# Patient Record
Sex: FEMALE | Race: NATIVE HAWAIIAN OR OTHER PACIFIC ISLANDER | HISPANIC OR LATINO | Employment: PART TIME | URBAN - METROPOLITAN AREA
[De-identification: names, ages, dates, MRNs, and addresses within clinical notes are randomized per-mention and may not be internally consistent; named-entity substitution may affect disease eponyms.]

---

## 2022-02-24 ENCOUNTER — OFFICE VISIT (OUTPATIENT)
Dept: OBSTETRICS AND GYNECOLOGY | Facility: CLINIC | Age: 22
End: 2022-02-24
Payer: MEDICAID

## 2022-02-24 VITALS
HEART RATE: 69 BPM | DIASTOLIC BLOOD PRESSURE: 84 MMHG | HEIGHT: 65 IN | WEIGHT: 125.5 LBS | BODY MASS INDEX: 20.91 KG/M2 | SYSTOLIC BLOOD PRESSURE: 115 MMHG

## 2022-02-24 DIAGNOSIS — Z76.89 ENCOUNTER TO ESTABLISH CARE: ICD-10-CM

## 2022-02-24 DIAGNOSIS — Z11.3 SCREENING FOR STDS (SEXUALLY TRANSMITTED DISEASES): Primary | ICD-10-CM

## 2022-02-24 DIAGNOSIS — Z01.419 WELL WOMAN EXAM WITH ROUTINE GYNECOLOGICAL EXAM: ICD-10-CM

## 2022-02-24 PROCEDURE — 99385 PREV VISIT NEW AGE 18-39: CPT | Mod: S$GLB,,, | Performed by: NURSE PRACTITIONER

## 2022-02-24 PROCEDURE — 3079F PR MOST RECENT DIASTOLIC BLOOD PRESSURE 80-89 MM HG: ICD-10-PCS | Mod: CPTII,S$GLB,, | Performed by: NURSE PRACTITIONER

## 2022-02-24 PROCEDURE — 3079F DIAST BP 80-89 MM HG: CPT | Mod: CPTII,S$GLB,, | Performed by: NURSE PRACTITIONER

## 2022-02-24 PROCEDURE — 1160F PR REVIEW ALL MEDS BY PRESCRIBER/CLIN PHARMACIST DOCUMENTED: ICD-10-PCS | Mod: CPTII,S$GLB,, | Performed by: NURSE PRACTITIONER

## 2022-02-24 PROCEDURE — 1159F PR MEDICATION LIST DOCUMENTED IN MEDICAL RECORD: ICD-10-PCS | Mod: CPTII,S$GLB,, | Performed by: NURSE PRACTITIONER

## 2022-02-24 PROCEDURE — 1160F RVW MEDS BY RX/DR IN RCRD: CPT | Mod: CPTII,S$GLB,, | Performed by: NURSE PRACTITIONER

## 2022-02-24 PROCEDURE — 99385 PR PREVENTIVE VISIT,NEW,18-39: ICD-10-PCS | Mod: S$GLB,,, | Performed by: NURSE PRACTITIONER

## 2022-02-24 PROCEDURE — 3008F PR BODY MASS INDEX (BMI) DOCUMENTED: ICD-10-PCS | Mod: CPTII,S$GLB,, | Performed by: NURSE PRACTITIONER

## 2022-02-24 PROCEDURE — 3074F PR MOST RECENT SYSTOLIC BLOOD PRESSURE < 130 MM HG: ICD-10-PCS | Mod: CPTII,S$GLB,, | Performed by: NURSE PRACTITIONER

## 2022-02-24 PROCEDURE — 1159F MED LIST DOCD IN RCRD: CPT | Mod: CPTII,S$GLB,, | Performed by: NURSE PRACTITIONER

## 2022-02-24 PROCEDURE — 3074F SYST BP LT 130 MM HG: CPT | Mod: CPTII,S$GLB,, | Performed by: NURSE PRACTITIONER

## 2022-02-24 PROCEDURE — 3008F BODY MASS INDEX DOCD: CPT | Mod: CPTII,S$GLB,, | Performed by: NURSE PRACTITIONER

## 2022-02-24 NOTE — PROGRESS NOTES
"  Subjective:       Patient ID: Cassie Moore is a 21 y.o. female.    Chief Complaint:  Well Woman and STD CHECK      History of Present Illness  Pt here for gyn annual.  History and past labs reviewed with patient.    Complaints none and declined BC at this time.       Review of Systems  Review of Systems   Constitutional: Negative for appetite change and fatigue.   HENT: Negative for tinnitus.    Cardiovascular: Negative for chest pain and palpitations.   Gastrointestinal: Negative for abdominal pain, bloating, constipation, vomiting and reflux.   Endocrine: Negative for diabetes, hyperthyroidism and hypothyroidism.   Genitourinary: Negative for flank pain, menstrual problem, pelvic pain and postcoital bleeding.   Musculoskeletal: Negative for back pain and myalgias.   Integumentary:  Negative for rash, mole/lesion, breast mass and breast tenderness.   Neurological: Negative for vertigo, syncope and headaches.   Psychiatric/Behavioral: Negative for depression and sleep disturbance. The patient is not nervous/anxious.    Breast: Negative for lump, mass, mastodynia and tenderness          Objective:     Vitals:    02/24/22 1137   BP: 115/84   Pulse: 69   Weight: 56.9 kg (125 lb 8 oz)   Height: 5' 5" (1.651 m)        Physical Exam:   Constitutional: She is oriented to person, place, and time. She appears well-developed and well-nourished.    HENT:   Head: Normocephalic and atraumatic.    Eyes: Pupils are equal, round, and reactive to light. Conjunctivae are normal.    Neck: No thyromegaly present.    Cardiovascular: Normal rate, regular rhythm and normal heart sounds.  Exam reveals no clubbing, no cyanosis and no edema.     Pulmonary/Chest: Effort normal and breath sounds normal.        Abdominal: Soft. She exhibits no distension. Hernia confirmed negative in the right inguinal area and confirmed negative in the left inguinal area.     Genitourinary:    Vagina and uterus normal.      Pelvic exam was performed with " patient supine.   There is no rash, tenderness, lesion or injury on the right labia. There is no rash, tenderness, lesion or injury on the left labia. Cervix is normal. Right adnexum displays no mass, no tenderness and no fullness. Left adnexum displays no mass, no tenderness and no fullness. Cervix exhibits no discharge.           Musculoskeletal: Normal range of motion and moves all extremeties.       Neurological: She is alert and oriented to person, place, and time.    Skin: Skin is warm and dry. No cyanosis. Nails show no clubbing.    Psychiatric: She has a normal mood and affect. Her behavior is normal.       breast exam wnl- no nipple dc, skin changes, masses or lymph nodes palpated bilaterally    Assessment:     1. Screening for STDs (sexually transmitted diseases)    2. Well woman exam with routine gynecological exam              Plan:       Screening for STDs (sexually transmitted diseases)  -     C. trachomatis/N. gonorrhoeae by AMP DNA [facility]; Cervicovaginal  -     Trichomonas Vaginalis, [facility]; Future; Expected date: 02/24/2022    Well woman exam with routine gynecological exam  -     Liquid-based pap smear, screening         No follow-ups on file.

## 2022-02-28 DIAGNOSIS — B37.9 YEAST DETECTED: Primary | ICD-10-CM

## 2022-02-28 LAB
CHLAMYDIA: NEGATIVE
GONORRHEA: NEGATIVE
SOURCE: NORMAL
SOURCE: NORMAL
TRICHOMONAS AMPLIFIED: NEGATIVE

## 2022-02-28 RX ORDER — FLUCONAZOLE 100 MG/1
100 TABLET ORAL DAILY
Qty: 3 TABLET | Refills: 0 | Status: SHIPPED | OUTPATIENT
Start: 2022-02-28 | End: 2022-03-03

## 2023-04-28 ENCOUNTER — TELEPHONE (OUTPATIENT)
Dept: OBSTETRICS AND GYNECOLOGY | Facility: CLINIC | Age: 23
End: 2023-04-28
Payer: MEDICAID

## 2023-07-05 ENCOUNTER — TELEPHONE (OUTPATIENT)
Dept: OBSTETRICS AND GYNECOLOGY | Facility: CLINIC | Age: 23
End: 2023-07-05
Payer: MEDICAID

## 2023-07-05 NOTE — TELEPHONE ENCOUNTER
---    New Ob Request received and pt was informed that the Doctor has  approve the request.  Melinda            -- Message from Bianca Chavez sent at 7/5/2023  1:30 PM CDT -----  Type:  Needs Medical Advice    Who Called: Cassie Moore  Symptoms (please be specific):  NOB ( was confirmed in [place], 6 wks on 6/20/23)    How long has patient had these symptoms:  -  Pharmacy name and phone #:  -  Would the patient rather a call back or a response via [facility]?    Best Call Back Number: [phone]    Additional Information: please call needs appt

## 2023-07-10 DIAGNOSIS — Z34.91 ENCOUNTER FOR PREGNANCY RELATED EXAMINATION IN FIRST TRIMESTER: Primary | ICD-10-CM

## 2023-07-11 ENCOUNTER — PROCEDURE VISIT (OUTPATIENT)
Dept: OBSTETRICS AND GYNECOLOGY | Facility: CLINIC | Age: 23
End: 2023-07-11
Payer: MEDICAID

## 2023-07-11 DIAGNOSIS — Z34.91 ENCOUNTER FOR PREGNANCY RELATED EXAMINATION IN FIRST TRIMESTER: ICD-10-CM

## 2023-07-11 PROCEDURE — 76817 US OB/GYN PROCEDURE (VIEWPOINT): ICD-10-PCS | Mod: S$GLB,,, | Performed by: OBSTETRICS & GYNECOLOGY

## 2023-07-11 PROCEDURE — 76817 TRANSVAGINAL US OBSTETRIC: CPT | Mod: S$GLB,,, | Performed by: OBSTETRICS & GYNECOLOGY

## 2023-07-12 LAB
ABS NRBC COUNT: 0 X 10 3/UL (ref 0–0.01)
ABSOLUTE BASOPHIL: 0.06 X 10 3/UL (ref 0–0.22)
ABSOLUTE EOSINOPHIL: 0.02 X 10 3/UL (ref 0.04–0.54)
ABSOLUTE IMMATURE GRAN: 0.03 X 10 3/UL (ref 0–0.04)
ABSOLUTE LYMPHOCYTE: 1.78 X 10 3/UL (ref 0.86–4.75)
ABSOLUTE MONOCYTE: 0.5 X 10 3/UL (ref 0.22–1.08)
ANTIBODY SCREEN: NEGATIVE
BASOPHILS NFR BLD: 0.7 % (ref 0.2–1.2)
BLOOD GROUPING: NORMAL
BLOOD TYPE (D): POSITIVE
EOSINOPHIL NFR BLD: 0.2 % (ref 0.7–7)
HBV SURFACE AG SERPL QL IA: NONREACTIVE
HCT VFR BLD AUTO: 45.8 % (ref 37–47)
HCV IGG SERPL QL IA: NONREACTIVE
HGB BLD-MCNC: 15.7 G/DL (ref 12–16)
HIV 1+2 AB+HIV1 P24 AG SERPL QL IA: NONREACTIVE
IMMATURE GRANULOCYTES: 0.4 % (ref 0–0.5)
LYMPHOCYTES NFR BLD: 22.2 % (ref 19.3–53.1)
MCH RBC QN AUTO: 30.3 PG (ref 27–32)
MCHC RBC AUTO-ENTMCNC: 34.3 G/DL (ref 32–36)
MCV RBC AUTO: 88.4 FL (ref 82–100)
MONOCYTES NFR BLD: 6.2 % (ref 4.7–12.5)
NEUTROPHILS # BLD AUTO: 5.62 X 10 3/UL (ref 2.15–7.56)
NEUTROPHILS NFR BLD: 70.3 % (ref 34–71.1)
NUCLEATED RED BLOOD CELLS: 0 /100 WBC (ref 0–0.2)
PLATELET # BLD AUTO: 245 X 10 3/UL (ref 135–400)
RBC # BLD AUTO: 5.18 X 10 6/UL (ref 4.2–5.4)
RDW-SD: 44.4 FL (ref 37–54)
RUBELLA IGG SCREEN: NORMAL
SICKLE CELL PREP: NEGATIVE
SYPHILIS TREPONEMAL ANTIBODY: NONREACTIVE
WBC # BLD: 8.01 X 10 3/UL (ref 4.3–10.8)

## 2023-08-10 ENCOUNTER — ROUTINE PRENATAL (OUTPATIENT)
Dept: OBSTETRICS AND GYNECOLOGY | Facility: CLINIC | Age: 23
End: 2023-08-10
Payer: MEDICAID

## 2023-08-10 VITALS
BODY MASS INDEX: 23.13 KG/M2 | WEIGHT: 139 LBS | SYSTOLIC BLOOD PRESSURE: 112 MMHG | HEART RATE: 79 BPM | DIASTOLIC BLOOD PRESSURE: 76 MMHG

## 2023-08-10 DIAGNOSIS — Z34.91 ENCOUNTER FOR PREGNANCY RELATED EXAMINATION IN FIRST TRIMESTER: Primary | ICD-10-CM

## 2023-08-10 PROCEDURE — 99203 OFFICE O/P NEW LOW 30 MIN: CPT | Mod: TH,S$GLB,, | Performed by: OBSTETRICS & GYNECOLOGY

## 2023-08-10 PROCEDURE — 99203 PR OFFICE/OUTPT VISIT, NEW, LEVL III, 30-44 MIN: ICD-10-PCS | Mod: TH,S$GLB,, | Performed by: OBSTETRICS & GYNECOLOGY

## 2023-09-05 ENCOUNTER — ROUTINE PRENATAL (OUTPATIENT)
Dept: OBSTETRICS AND GYNECOLOGY | Facility: CLINIC | Age: 23
End: 2023-09-05
Payer: MEDICAID

## 2023-09-05 VITALS
WEIGHT: 139 LBS | DIASTOLIC BLOOD PRESSURE: 65 MMHG | HEART RATE: 98 BPM | BODY MASS INDEX: 23.13 KG/M2 | SYSTOLIC BLOOD PRESSURE: 103 MMHG

## 2023-09-05 DIAGNOSIS — Z34.02 ENCOUNTER FOR SUPERVISION OF NORMAL FIRST PREGNANCY IN SECOND TRIMESTER: Primary | ICD-10-CM

## 2023-09-05 PROCEDURE — 99213 PR OFFICE/OUTPT VISIT, EST, LEVL III, 20-29 MIN: ICD-10-PCS | Mod: TH,S$GLB,, | Performed by: NURSE PRACTITIONER

## 2023-09-05 PROCEDURE — 99213 OFFICE O/P EST LOW 20 MIN: CPT | Mod: TH,S$GLB,, | Performed by: NURSE PRACTITIONER

## 2023-09-05 NOTE — PROGRESS NOTES
Subjective:       Patient ID: Cassie Moore is a 23 y.o.  at 15w5d      Chief Complaint:  Routine Prenatal Visit      History of Present Illness  No complaints. Reports normal pregnancy ssx . Labs and history reviewed with pt.       Review of Systems  Denies n/v, f/c, dysuria, contractions,   VD, VB, round ligament pain, headaches      OB History          1    Para   0    Term   0       0    AB   0    Living   0         SAB   0    IAB   0    Ectopic   0    Multiple   0    Live Births   0                   Objective:     Vitals:    23 1346   BP: 103/65   Pulse: 98     Wt Readings from Last 3 Encounters:   23 63 kg (139 lb)   08/10/23 63 kg (139 lb)   22 56.9 kg (125 lb 8 oz)        nad  NCAT  pupils normal size  Skin nml no rashes or lesions  No resp distress, resp even and unlabored  Gravid nt, no rebound no guarding  No cyanosis or clubbing, edema appropriate for pregn  FH AGA  FHT: 150s       Assessment:        1. Encounter for supervision of normal first pregnancy in second trimester                Plan:        Encounter for supervision of normal first pregnancy in second trimester  -     Maternal Screen AFP (Single Marker); Future; Expected date: 2023           Encouraged PNV  Pain, fever, bleeding precautions   Follow up in about 4 weeks (around 10/3/2023).

## 2023-09-07 ENCOUNTER — PATIENT MESSAGE (OUTPATIENT)
Dept: OTHER | Facility: OTHER | Age: 23
End: 2023-09-07
Payer: MEDICAID

## 2023-09-09 LAB
AFP MOM: 1.09
AFP, SERUM: 37.3 NG/ML
CALC'D GESTATIONAL AGE: 15.7 WEEKS
COLLECTION DATE: NORMAL
COMMENT: NORMAL
DATE OF BIRTH: NORMAL
DONOR AGE: EGG RETRIEVAL: NORMAL
DONOR EGG: NO
EDD DETERMINED BY: NORMAL
EST'D DATE OF DELIVERY: NORMAL
HX OF NEURAL TUBE DEFECTS: NO
INSULIN DEPEND DIABETIC: NO
INTERPRETATION: NORMAL
Lab: NORMAL
MATERNAL WEIGHT: 139 LBS
MOTHER'S ETHNIC ORIGIN: NORMAL
NUMBER OF FETUSES: 1
PREV PREGNANCY DOWN SYND: NO
REPEAT SPECIMEN: NO
RISK FOR ONTD: NORMAL

## 2023-09-14 ENCOUNTER — PATIENT MESSAGE (OUTPATIENT)
Dept: OTHER | Facility: OTHER | Age: 23
End: 2023-09-14
Payer: MEDICAID

## 2023-09-25 ENCOUNTER — TELEPHONE (OUTPATIENT)
Dept: OBSTETRICS AND GYNECOLOGY | Facility: CLINIC | Age: 23
End: 2023-09-25
Payer: MEDICAID

## 2023-09-25 NOTE — TELEPHONE ENCOUNTER
----- Message from Olivia Capellan sent at 9/25/2023 12:19 PM CDT -----  Patient is calling to schedule due to fall..Please call her back at [phone].            Thanks  miriam

## 2023-09-25 NOTE — TELEPHONE ENCOUNTER
Reports fall on back at work.  Denies any vaginal bleeding, LOF,  or abdominal pain.  ER warnings given

## 2023-09-26 ENCOUNTER — ROUTINE PRENATAL (OUTPATIENT)
Dept: OBSTETRICS AND GYNECOLOGY | Facility: CLINIC | Age: 23
End: 2023-09-26
Payer: MEDICAID

## 2023-09-26 ENCOUNTER — PROCEDURE VISIT (OUTPATIENT)
Dept: OBSTETRICS AND GYNECOLOGY | Facility: CLINIC | Age: 23
End: 2023-09-26
Payer: MEDICAID

## 2023-09-26 VITALS
WEIGHT: 140.38 LBS | SYSTOLIC BLOOD PRESSURE: 107 MMHG | HEART RATE: 84 BPM | BODY MASS INDEX: 23.36 KG/M2 | DIASTOLIC BLOOD PRESSURE: 73 MMHG

## 2023-09-26 DIAGNOSIS — Z34.02 ENCOUNTER FOR SUPERVISION OF NORMAL FIRST PREGNANCY IN SECOND TRIMESTER: Primary | ICD-10-CM

## 2023-09-26 DIAGNOSIS — O46.90 VAGINAL BLEEDING DURING PREGNANCY: ICD-10-CM
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
## 2023-09-26 PROCEDURE — 99213 PR OFFICE/OUTPT VISIT, EST, LEVL III, 20-29 MIN: ICD-10-PCS | Mod: TH,S$GLB,, | Performed by: NURSE PRACTITIONER

## 2023-09-26 PROCEDURE — 99213 OFFICE O/P EST LOW 20 MIN: CPT | Mod: TH,S$GLB,, | Performed by: NURSE PRACTITIONER

## 2023-09-26 PROCEDURE — 76815 OB US LIMITED FETUS(S): CPT | Mod: S$GLB,,, | Performed by: OBSTETRICS & GYNECOLOGY

## 2023-09-26 PROCEDURE — 76815 US OB/GYN EXTENDED PROCEDURE (VIEWPOINT): ICD-10-PCS | Mod: S$GLB,,, | Performed by: OBSTETRICS & GYNECOLOGY

## 2023-09-26 NOTE — PROGRESS NOTES
Subjective:       Patient ID: Cassie Moore is a 23 y.o.  at 18w5d      Chief Complaint:  Routine Prenatal Visit      History of Present Illness  Complains of lower back and abdominal pain s/p fall yesterday at work. States she landed on her back. . Labs and history reviewed with pt.       Review of Systems  Denies n/v, f/c, dysuria, contractions,   VD, VB, round ligament pain, headaches      OB History          1    Para   0    Term   0       0    AB   0    Living   0         SAB   0    IAB   0    Ectopic   0    Multiple   0    Live Births   0                   Objective:     Vitals:    23 0843   BP: 107/73   Pulse: 84     Wt Readings from Last 3 Encounters:   23 63.7 kg (140 lb 6 oz)   23 63 kg (139 lb)   08/10/23 63 kg (139 lb)        nad  NCAT  pupils normal size  Skin nml no rashes or lesions  No resp distress, resp even and unlabored  Gravid nt, no rebound no guarding  No cyanosis or clubbing, edema appropriate for pregn  FH AGA  FHT: 140s       Assessment:        1. Encounter for supervision of normal first pregnancy in second trimester    2. Vaginal bleeding during pregnancy    3. Fall, initial encounter                Plan:        Encounter for supervision of normal first pregnancy in second trimester    Vaginal bleeding during pregnancy  -     US OB/GYN Procedure ([facility]) - Extended List; Future    , initial encounter  -     US OB/GYN Procedure ([facility]) - Extended List; Future       Tylenol/rest/maternity belt   Ultrasound today   Encouraged PNV  Pain, fever, bleeding precautions   Follow up for KA next week.

## 2023-10-04 ENCOUNTER — ROUTINE PRENATAL (OUTPATIENT)
Dept: OBSTETRICS AND GYNECOLOGY | Facility: CLINIC | Age: 23
End: 2023-10-04
Payer: MEDICAID

## 2023-10-04 VITALS
HEART RATE: 92 BPM | SYSTOLIC BLOOD PRESSURE: 101 MMHG | BODY MASS INDEX: 23.8 KG/M2 | DIASTOLIC BLOOD PRESSURE: 66 MMHG | WEIGHT: 143 LBS

## 2023-10-04 DIAGNOSIS — Z36.89 ENCOUNTER FOR FETAL ANATOMIC SURVEY: ICD-10-CM

## 2023-10-04 DIAGNOSIS — Z34.02 ENCOUNTER FOR SUPERVISION OF NORMAL FIRST PREGNANCY IN SECOND TRIMESTER: Primary | ICD-10-CM

## 2023-10-04 PROCEDURE — 90686 FLU VACCINE (QUAD) GREATER THAN OR EQUAL TO 3YO PRESERVATIVE FREE IM: ICD-10-PCS | Mod: S$GLB,,, | Performed by: NURSE PRACTITIONER

## 2023-10-04 PROCEDURE — 90471 FLU VACCINE (QUAD) GREATER THAN OR EQUAL TO 3YO PRESERVATIVE FREE IM: ICD-10-PCS | Mod: S$GLB,,, | Performed by: NURSE PRACTITIONER

## 2023-10-04 PROCEDURE — 90686 IIV4 VACC NO PRSV 0.5 ML IM: CPT | Mod: S$GLB,,, | Performed by: NURSE PRACTITIONER

## 2023-10-04 PROCEDURE — 90471 IMMUNIZATION ADMIN: CPT | Mod: S$GLB,,, | Performed by: NURSE PRACTITIONER

## 2023-10-04 PROCEDURE — 99213 PR OFFICE/OUTPT VISIT, EST, LEVL III, 20-29 MIN: ICD-10-PCS | Mod: 25,TH,S$GLB, | Performed by: NURSE PRACTITIONER

## 2023-10-04 PROCEDURE — 99213 OFFICE O/P EST LOW 20 MIN: CPT | Mod: 25,TH,S$GLB, | Performed by: NURSE PRACTITIONER

## 2023-10-04 NOTE — PROGRESS NOTES
Subjective:       Patient ID: Cassie Moore is a 23 y.o.  at 19w6d      Chief Complaint:  Routine Prenatal Visit      History of Present Illness  No complaints. Reports normal quickening. Labs and history reviewed with pt.       Review of Systems  Denies n/v, f/c, dysuria, contractions,   VD, VB, round ligament pain, headaches      OB History          1    Para   0    Term   0       0    AB   0    Living   0         SAB   0    IAB   0    Ectopic   0    Multiple   0    Live Births   0                   Objective:     Vitals:    10/04/23 1333   BP: 101/66   Pulse: 92     Wt Readings from Last 3 Encounters:   10/04/23 64.9 kg (143 lb)   23 63.7 kg (140 lb 6 oz)   23 63 kg (139 lb)        nad  NCAT  pupils normal size  Skin nml no rashes or lesions  No resp distress, resp even and unlabored  Gravid nt, no rebound no guarding  No cyanosis or clubbing, edema appropriate for pregn  FH AGA  FHT: 140s       Assessment:        1. Encounter for supervision of normal first pregnancy in second trimester    2. Encounter for fetal anatomic survey                Plan:        Encounter for supervision of normal first pregnancy in second trimester  -     Influenza - Quadrivalent (PF)    Encounter for fetal anatomic survey           Encouraged PNV  Pain, fever, bleeding precautions   Follow up in about 4 weeks (around 2023).

## 2023-10-05 ENCOUNTER — PATIENT MESSAGE (OUTPATIENT)
Dept: OTHER | Facility: OTHER | Age: 23
End: 2023-10-05
Payer: MEDICAID

## 2023-10-17 ENCOUNTER — PROCEDURE VISIT (OUTPATIENT)
Dept: OBSTETRICS AND GYNECOLOGY | Facility: CLINIC | Age: 23
End: 2023-10-17
Payer: MEDICAID

## 2023-10-17 DIAGNOSIS — Z36.89 ENCOUNTER FOR FETAL ANATOMIC SURVEY: ICD-10-CM

## 2023-10-17 PROCEDURE — 76805 OB US >/= 14 WKS SNGL FETUS: CPT | Mod: S$GLB,,, | Performed by: OBSTETRICS & GYNECOLOGY

## 2023-10-17 PROCEDURE — 76805 US OB/GYN EXTENDED PROCEDURE (VIEWPOINT): ICD-10-PCS | Mod: S$GLB,,, | Performed by: OBSTETRICS & GYNECOLOGY

## 2023-11-01 ENCOUNTER — ROUTINE PRENATAL (OUTPATIENT)
Dept: OBSTETRICS AND GYNECOLOGY | Facility: CLINIC | Age: 23
End: 2023-11-01
Payer: MEDICAID

## 2023-11-01 VITALS
BODY MASS INDEX: 24.55 KG/M2 | SYSTOLIC BLOOD PRESSURE: 108 MMHG | HEART RATE: 82 BPM | WEIGHT: 147.5 LBS | DIASTOLIC BLOOD PRESSURE: 74 MMHG

## 2023-11-01 DIAGNOSIS — Z34.02 ENCOUNTER FOR SUPERVISION OF NORMAL FIRST PREGNANCY IN SECOND TRIMESTER: Primary | ICD-10-CM

## 2023-11-01 PROCEDURE — 99213 PR OFFICE/OUTPT VISIT, EST, LEVL III, 20-29 MIN: ICD-10-PCS | Mod: TH,S$GLB,, | Performed by: NURSE PRACTITIONER

## 2023-11-01 PROCEDURE — 99213 OFFICE O/P EST LOW 20 MIN: CPT | Mod: TH,S$GLB,, | Performed by: NURSE PRACTITIONER

## 2023-11-01 NOTE — PROGRESS NOTES
Subjective:       Patient ID: Cassie Moore is a 23 y.o.  at 23w6d      Chief Complaint:  Routine Prenatal Visit      History of Present Illness  No complaints. Reports normal fetal movement. Labs and history reviewed with pt.       Review of Systems  Denies n/v, f/c, dysuria, contractions,   VD, VB, round ligament pain, headaches      OB History          1    Para   0    Term   0       0    AB   0    Living   0         SAB   0    IAB   0    Ectopic   0    Multiple   0    Live Births   0                   Objective:     Vitals:    23 0824   BP: 108/74   Pulse: 82     Wt Readings from Last 3 Encounters:   23 66.9 kg (147 lb 8 oz)   10/04/23 64.9 kg (143 lb)   23 63.7 kg (140 lb 6 oz)        nad  NCAT  pupils normal size  Skin nml no rashes or lesions  No resp distress, resp even and unlabored  Gravid nt, no rebound no guarding  No cyanosis or clubbing, edema appropriate for pregn  FH AGA  FHT: 140s       Assessment:        1. Encounter for supervision of normal first pregnancy in second trimester                Plan:        Encounter for supervision of normal first pregnancy in second trimester  -     Glucose, 1 Hr Post 50 GM; Future; Expected date: 2023         Anatomy wnl   Encouraged PNV  Pain, fever, bleeding precautions   Follow up in about 4 weeks (around 2023).

## 2023-11-02 ENCOUNTER — PATIENT MESSAGE (OUTPATIENT)
Dept: OTHER | Facility: OTHER | Age: 23
End: 2023-11-02
Payer: MEDICAID

## 2023-11-02 LAB — GLUCOSE 1 HR POST 50 GM: 138 MG/DL

## 2023-11-16 ENCOUNTER — TELEPHONE (OUTPATIENT)
Dept: OBSTETRICS AND GYNECOLOGY | Facility: CLINIC | Age: 23
End: 2023-11-16
Payer: MEDICAID

## 2023-11-16 NOTE — TELEPHONE ENCOUNTER
Pt is aware of her flu vaccination was fax.  Melinda            ----- Message from Bianca Chavez sent at 11/16/2023  8:59 AM CST -----  Type:  Needs Medical Advice    Who Called: Cassie Moore  Symptoms (please be specific): -   How long has patient had these symptoms:  -  Pharmacy name and phone #:  -  Would the patient rather a call back or a response via [facility]?    Best Call Back Number: [phone]    Additional Information:  pt needs copy of her flu vaccination wants to know if it can be faxed to [phone]

## 2023-12-06 ENCOUNTER — ROUTINE PRENATAL (OUTPATIENT)
Dept: OBSTETRICS AND GYNECOLOGY | Facility: CLINIC | Age: 23
End: 2023-12-06
Payer: MEDICAID

## 2023-12-06 VITALS
HEART RATE: 93 BPM | SYSTOLIC BLOOD PRESSURE: 104 MMHG | BODY MASS INDEX: 26.19 KG/M2 | DIASTOLIC BLOOD PRESSURE: 68 MMHG | WEIGHT: 157.38 LBS

## 2023-12-06 DIAGNOSIS — Z34.03 ENCOUNTER FOR SUPERVISION OF NORMAL FIRST PREGNANCY IN THIRD TRIMESTER: Primary | ICD-10-CM

## 2023-12-06 PROCEDURE — 90471 TDAP VACCINE GREATER THAN OR EQUAL TO 7YO IM: ICD-10-PCS | Mod: S$GLB,,, | Performed by: NURSE PRACTITIONER

## 2023-12-06 PROCEDURE — 90471 IMMUNIZATION ADMIN: CPT | Mod: S$GLB,,, | Performed by: NURSE PRACTITIONER

## 2023-12-06 PROCEDURE — 90715 TDAP VACCINE 7 YRS/> IM: CPT | Mod: S$GLB,,, | Performed by: NURSE PRACTITIONER

## 2023-12-06 PROCEDURE — 90715 TDAP VACCINE GREATER THAN OR EQUAL TO 7YO IM: ICD-10-PCS | Mod: S$GLB,,, | Performed by: NURSE PRACTITIONER

## 2023-12-06 PROCEDURE — 99213 PR OFFICE/OUTPT VISIT, EST, LEVL III, 20-29 MIN: ICD-10-PCS | Mod: 25,TH,S$GLB, | Performed by: NURSE PRACTITIONER

## 2023-12-06 PROCEDURE — 99213 OFFICE O/P EST LOW 20 MIN: CPT | Mod: 25,TH,S$GLB, | Performed by: NURSE PRACTITIONER

## 2023-12-06 NOTE — PROGRESS NOTES
CC: Follow-Up OB    HPI:   23 y.o.  at 28w6d with EDC of 2024, by Ultrasound, here for her follow up OB visit.  Denies any complaints.    Pregnancy ROS:  Positive fetal movement   Negative leakage of fluid   Negative vaginal bleeding   Negative headache, vision changes, RUQ pain, epigastric pain  Negative contractions/abdominal pain   Negative pelvic pressure     Physical Exam:  Prenatal Vitals  BP: 104/68  Weight: 71.4 kg (157 lb 6 oz)  Fetal Heart Rate: 140's  Urine Glucose: Negative  Urine Albumin: Negative    Wt Readings from Last 3 Encounters:   23 71.4 kg (157 lb 6 oz)   23 66.9 kg (147 lb 8 oz)   10/04/23 64.9 kg (143 lb)       Body mass index is 26.19 kg/m².    General: NAD, well developed, well nourished  Psych: alert and oriented to person, time and place, normal affect  HEENT: normocephalic, atraumatic  Abd: Gravid, soft, NT, ND  Skin: warm, dry  Neuro: normal gait, gross motor function intact  No cyanosis, clubbing or edema    FHTs: 140s  FH: AGA    Maternal Blood Type: O+/-    ASSESSMENT: 23 y.o.  @ 28w6d with   1. Encounter for supervision of normal first pregnancy in third trimester         PLAN:  Encounter for supervision of normal first pregnancy in third trimester  -     CBC Auto Differential; Future; Expected date: 2023  -     Tdap Vaccine       Pain, fever, bleeding precautions  Labor, Fetal movement, and Preeclampsia precautions  Cont PNV  Return to clinic in 2 weeks

## 2023-12-14 ENCOUNTER — PATIENT MESSAGE (OUTPATIENT)
Dept: OTHER | Facility: OTHER | Age: 23
End: 2023-12-14
Payer: MEDICAID

## 2023-12-14 LAB
APPEARANCE, UA: CLEAR
BILIRUB UR QL STRIP: NEGATIVE MG/DL
COLOR UR: ABNORMAL
FETAL FIBRONECTIN: NEGATIVE
GLUCOSE (UA): 1000 MG/DL
HGB UR QL STRIP: NEGATIVE /UL
KETONES UR QL STRIP: ABNORMAL MG/DL
LEUKOCYTE ESTERASE UR QL STRIP: NEGATIVE /UL
NITRITE UR QL STRIP: NEGATIVE
PH UR STRIP: 7 PH (ref 5–9)
PROT UR QL STRIP: NEGATIVE MG/DL
SP GR UR STRIP: 1.01 (ref 1–1.03)
SPECIMEN COLLECTION METHOD, URINE: ABNORMAL
UROBILINOGEN UR STRIP-ACNC: NORMAL MG/DL

## 2023-12-15 ENCOUNTER — TELEPHONE (OUTPATIENT)
Dept: OBSTETRICS AND GYNECOLOGY | Facility: CLINIC | Age: 23
End: 2023-12-15
Payer: MEDICAID

## 2023-12-15 NOTE — TELEPHONE ENCOUNTER
-  Pt is aware of her appt schedule 12/20/23 at 1:00. Melinda        ---- Message from Hernandez Berman sent at 12/15/2023  3:07 PM CST -----  Contact: Cassie Harris is calling in regards to getting a call back to discuss rescheduling her appt to the week of Dec 18th-22nd if available.  Please call back at  [phone]        Thanks

## 2023-12-20 ENCOUNTER — ROUTINE PRENATAL (OUTPATIENT)
Dept: OBSTETRICS AND GYNECOLOGY | Facility: CLINIC | Age: 23
End: 2023-12-20
Payer: MEDICAID

## 2023-12-20 VITALS
DIASTOLIC BLOOD PRESSURE: 69 MMHG | BODY MASS INDEX: 26.79 KG/M2 | HEART RATE: 99 BPM | WEIGHT: 161 LBS | SYSTOLIC BLOOD PRESSURE: 108 MMHG

## 2023-12-20 DIAGNOSIS — Z34.03 ENCOUNTER FOR SUPERVISION OF NORMAL FIRST PREGNANCY IN THIRD TRIMESTER: Primary | ICD-10-CM

## 2023-12-20 DIAGNOSIS — O99.019 ANTEPARTUM ANEMIA: ICD-10-CM

## 2023-12-20 PROCEDURE — 99213 OFFICE O/P EST LOW 20 MIN: CPT | Mod: TH,S$GLB,, | Performed by: NURSE PRACTITIONER

## 2023-12-20 PROCEDURE — 99213 PR OFFICE/OUTPT VISIT, EST, LEVL III, 20-29 MIN: ICD-10-PCS | Mod: TH,S$GLB,, | Performed by: NURSE PRACTITIONER

## 2023-12-20 RX ORDER — FERROUS SULFATE 325(65) MG
325 TABLET, DELAYED RELEASE (ENTERIC COATED) ORAL DAILY
Qty: 30 TABLET | Refills: 3 | Status: SHIPPED | OUTPATIENT
Start: 2023-12-20

## 2023-12-20 NOTE — PROGRESS NOTES
CC: Follow-Up OB    HPI:   23 y.o.  at 30w6d with EDC of 2024, by Ultrasound, here for her follow up OB visit.  Denies any complaints. Recently seen for contx with negative FFN. States she is feeling much better     Pregnancy ROS:  Positive fetal movement   Negative leakage of fluid   Negative vaginal bleeding   Negative headache, vision changes, RUQ pain, epigastric pain  Negative contractions/abdominal pain   Negative pelvic pressure     Physical Exam:  Prenatal Vitals  BP: 108/69  Weight: 73 kg (161 lb)  Fetal Heart Rate: 150s    Wt Readings from Last 3 Encounters:   23 73 kg (161 lb)   23 71.4 kg (157 lb 6 oz)   23 66.9 kg (147 lb 8 oz)       Body mass index is 26.79 kg/m².    General: NAD, well developed, well nourished  Psych: alert and oriented to person, time and place, normal affect  HEENT: normocephalic, atraumatic  Abd: Gravid, soft, NT, ND  Skin: warm, dry  Neuro: normal gait, gross motor function intact  No cyanosis, clubbing or edema    FHTs: 140s  FH: AGA    Maternal Blood Type: O+/-    Hgb 10.7  Hct 32.6    ASSESSMENT: 23 y.o.  @ 30w6d with   1. Encounter for supervision of normal first pregnancy in third trimester    2. Antepartum anemia         PLAN:  Encounter for supervision of normal first pregnancy in third trimester    Antepartum anemia  -     ferrous sulfate 325 (65 FE) MG EC tablet; Take 1 tablet (325 mg total) by mouth once daily.  Dispense: 30 tablet; Refill: 3       Pain, fever, bleeding precautions  Labor, Fetal movement, and Preeclampsia precautions  Cont PNV  Return to clinic in 2 weeks

## 2023-12-28 ENCOUNTER — PATIENT MESSAGE (OUTPATIENT)
Dept: OTHER | Facility: OTHER | Age: 23
End: 2023-12-28
Payer: MEDICAID

## 2024-01-03 ENCOUNTER — PATIENT MESSAGE (OUTPATIENT)
Dept: OBSTETRICS AND GYNECOLOGY | Facility: CLINIC | Age: 24
End: 2024-01-03

## 2024-01-03 ENCOUNTER — ROUTINE PRENATAL (OUTPATIENT)
Dept: OBSTETRICS AND GYNECOLOGY | Facility: CLINIC | Age: 24
End: 2024-01-03
Payer: MEDICAID

## 2024-01-03 VITALS
BODY MASS INDEX: 27.29 KG/M2 | DIASTOLIC BLOOD PRESSURE: 68 MMHG | SYSTOLIC BLOOD PRESSURE: 104 MMHG | HEART RATE: 85 BPM | WEIGHT: 164 LBS

## 2024-01-03 DIAGNOSIS — O99.019 ANTEPARTUM ANEMIA: ICD-10-CM

## 2024-01-03 DIAGNOSIS — Z34.03 ENCOUNTER FOR SUPERVISION OF NORMAL FIRST PREGNANCY IN THIRD TRIMESTER: Primary | ICD-10-CM

## 2024-01-03 PROCEDURE — 99213 OFFICE O/P EST LOW 20 MIN: CPT | Mod: TH,S$GLB,, | Performed by: NURSE PRACTITIONER

## 2024-01-03 NOTE — PROGRESS NOTES
CC: Follow-Up OB    HPI:   23 y.o.  at 32w6d with EDC of 2024, by Ultrasound, here for her follow up OB visit.  Denies any complaints. Seen at L&d last week for contx.     Pregnancy ROS:  Positive fetal movement   Negative leakage of fluid   Negative vaginal bleeding   Negative headache, vision changes, RUQ pain, epigastric pain  Negative contractions/abdominal pain   Negative pelvic pressure     Physical Exam:  Prenatal Vitals  BP: 104/68  Weight: 74.4 kg (164 lb)  Fetal Heart Rate: 150's    Wt Readings from Last 3 Encounters:   24 74.4 kg (164 lb)   23 73 kg (161 lb)   23 71.4 kg (157 lb 6 oz)       Body mass index is 27.29 kg/m².    General: NAD, well developed, well nourished  Psych: alert and oriented to person, time and place, normal affect  HEENT: normocephalic, atraumatic  Abd: Gravid, soft, NT, ND  Skin: warm, dry  Neuro: normal gait, gross motor function intact  No cyanosis, clubbing or edema    FHTs: 150s  FH: AGA    Maternal Blood Type: O+/-    ASSESSMENT: 23 y.o.  @ 32w6d with   1. Encounter for supervision of normal first pregnancy in third trimester    2. Antepartum anemia         PLAN:  Encounter for supervision of normal first pregnancy in third trimester    Antepartum anemia       Pain, fever, bleeding precautions  Labor, Fetal movement, and Preeclampsia precautions  Cont PNV/feso4  Return to clinic in 2 weeks

## 2024-01-09 LAB
APPEARANCE, UA: CLEAR
BACTERIA SPEC CULT: ABNORMAL /HPF
BILIRUB UR QL STRIP: NEGATIVE MG/DL
COLOR UR: ABNORMAL
GLUCOSE (UA): 1000 MG/DL
HGB UR QL STRIP: NEGATIVE /UL
KETONES UR QL STRIP: ABNORMAL MG/DL
LEUKOCYTE ESTERASE UR QL STRIP: 25 /UL
NITRITE UR QL STRIP: NEGATIVE
PH UR STRIP: 6.5 PH (ref 5–9)
PROT UR QL STRIP: 30 MG/DL
RBC #/AREA URNS HPF: ABNORMAL /HPF (ref 0–2)
SERVICE COMMENT 03: ABNORMAL
SP GR UR STRIP: 1.02 (ref 1–1.03)
SPECIMEN COLLECTION METHOD, URINE: ABNORMAL
SQUAMOUS EPITHELIAL, UA: ABNORMAL /LPF
UROBILINOGEN UR STRIP-ACNC: NORMAL MG/DL
WBC #/AREA URNS HPF: ABNORMAL /HPF (ref 0–5)

## 2024-01-17 ENCOUNTER — ROUTINE PRENATAL (OUTPATIENT)
Dept: OBSTETRICS AND GYNECOLOGY | Facility: CLINIC | Age: 24
End: 2024-01-17
Payer: MEDICAID

## 2024-01-17 VITALS
HEART RATE: 103 BPM | WEIGHT: 167.5 LBS | DIASTOLIC BLOOD PRESSURE: 69 MMHG | SYSTOLIC BLOOD PRESSURE: 117 MMHG | BODY MASS INDEX: 27.87 KG/M2

## 2024-01-17 DIAGNOSIS — Z34.83 ENCOUNTER FOR SUPERVISION OF NORMAL PREGNANCY IN MULTIGRAVIDA IN THIRD TRIMESTER: Primary | ICD-10-CM

## 2024-01-17 DIAGNOSIS — B86 SCABIES: ICD-10-CM

## 2024-01-17 PROCEDURE — 99213 OFFICE O/P EST LOW 20 MIN: CPT | Mod: TH,S$GLB,, | Performed by: NURSE PRACTITIONER

## 2024-01-17 RX ORDER — PERMETHRIN 50 MG/G
CREAM TOPICAL ONCE
Qty: 60 G | Refills: 1 | Status: SHIPPED | OUTPATIENT
Start: 2024-01-17 | End: 2024-01-17

## 2024-01-17 NOTE — PROGRESS NOTES
CC: Follow-Up OB    HPI:   23 y.o.  at 34w6d with EDC of 2024, by Ultrasound, here for her follow up OB visit.  Complains of itchy rash  and seen in the ER and dxed as PUPPs    Pregnancy ROS:  Positive fetal movement   Negative leakage of fluid   Negative vaginal bleeding   Negative headache, vision changes, RUQ pain, epigastric pain  Negative contractions/abdominal pain   Negative pelvic pressure     Physical Exam:  Prenatal Vitals  BP: 117/69  Weight: 76 kg (167 lb 8 oz)  Fetal Heart Rate: 130's    Wt Readings from Last 3 Encounters:   24 76 kg (167 lb 8 oz)   24 74.4 kg (164 lb)   23 73 kg (161 lb)       Body mass index is 27.87 kg/m².    General: NAD, well developed, well nourished  Psych: alert and oriented to person, time and place, normal affect  HEENT: normocephalic, atraumatic  Abd: Gravid, soft, NT, ND  Skin: warm, dry, linear rash +in between fingers  Neuro: normal gait, gross motor function intact  No cyanosis, clubbing or edema    FHTs: 130s  FH: AGA    Maternal Blood Type: O+/-    ASSESSMENT: 23 y.o.  @ 34w6d with   1. Encounter for supervision of normal pregnancy in multigravida in third trimester    2. Scabies         PLAN:  Encounter for supervision of normal pregnancy in multigravida in third trimester    Scabies  -     permethrin (ELIMITE) 5 % cream; Apply topically once. for 1 dose  Dispense: 60 g; Refill: 1       Pain, fever, bleeding precautions  Labor, Fetal movement, and Preeclampsia precautions  Cont PNV  Return to clinic in 1 week w/Dr Delgado

## 2024-01-18 ENCOUNTER — PATIENT MESSAGE (OUTPATIENT)
Dept: OTHER | Facility: OTHER | Age: 24
End: 2024-01-18
Payer: MEDICAID

## 2024-01-24 ENCOUNTER — ROUTINE PRENATAL (OUTPATIENT)
Dept: OBSTETRICS AND GYNECOLOGY | Facility: CLINIC | Age: 24
End: 2024-01-24
Payer: MEDICAID

## 2024-01-24 VITALS
DIASTOLIC BLOOD PRESSURE: 71 MMHG | HEART RATE: 91 BPM | SYSTOLIC BLOOD PRESSURE: 111 MMHG | BODY MASS INDEX: 27.69 KG/M2 | WEIGHT: 166.38 LBS

## 2024-01-24 DIAGNOSIS — Z34.83 ENCOUNTER FOR SUPERVISION OF NORMAL PREGNANCY IN MULTIGRAVIDA IN THIRD TRIMESTER: Primary | ICD-10-CM

## 2024-01-24 PROCEDURE — 99213 OFFICE O/P EST LOW 20 MIN: CPT | Mod: TH,S$GLB,, | Performed by: NURSE PRACTITIONER

## 2024-01-24 NOTE — PROGRESS NOTES
CC: Follow-Up OB    HPI:   23 y.o.  at 35w6d with EDC of 2024, by Ultrasound, here for her follow up OB visit.  Denies any complaints.    Pregnancy ROS:  Positive fetal movement   Negative leakage of fluid   Negative vaginal bleeding   Negative headache, vision changes, RUQ pain, epigastric pain  Negative contractions/abdominal pain   Negative pelvic pressure     Physical Exam:  Prenatal Vitals  BP: 111/71  Weight: 75.5 kg (166 lb 6 oz)  Fetal Heart Rate: 140s  Urine Glucose: Negative  Urine Albumin: Negative  Dilation/Effacement/Station  Dilation: Closed    Wt Readings from Last 3 Encounters:   24 75.5 kg (166 lb 6 oz)   24 76 kg (167 lb 8 oz)   24 74.4 kg (164 lb)       Body mass index is 27.69 kg/m².    General: NAD, well developed, well nourished  Psych: alert and oriented to person, time and place, normal affect  HEENT: normocephalic, atraumatic  Abd: Gravid, soft, NT, ND  Skin: warm, dry  Neuro: normal gait, gross motor function intact  Cvx closed/posterior/vtx  No cyanosis, clubbing or edema    FHTs: 140s  FH: AGA    Maternal Blood Type: O+/-    ASSESSMENT: 23 y.o.  @ 35w6d with   1. Encounter for supervision of normal pregnancy in multigravida in third trimester         PLAN:  Encounter for supervision of normal pregnancy in multigravida in third trimester  -     Strep B Screen, Vaginal / Rectal       Pain, fever, bleeding precautions  Labor, Fetal movement, and Preeclampsia precautions  Cont PNV  Return to clinic in 1 week W/Dr Delgado

## 2024-01-25 LAB
GROUP B STREP MOLECULAR: NEGATIVE
PENICILLIN ALLERGIC: NO

## 2024-01-30 ENCOUNTER — ROUTINE PRENATAL (OUTPATIENT)
Dept: OBSTETRICS AND GYNECOLOGY | Facility: CLINIC | Age: 24
End: 2024-01-30
Payer: MEDICAID

## 2024-01-30 VITALS
WEIGHT: 168 LBS | DIASTOLIC BLOOD PRESSURE: 67 MMHG | SYSTOLIC BLOOD PRESSURE: 101 MMHG | BODY MASS INDEX: 27.96 KG/M2 | HEART RATE: 102 BPM

## 2024-01-30 DIAGNOSIS — Z34.03 ENCOUNTER FOR SUPERVISION OF NORMAL FIRST PREGNANCY IN THIRD TRIMESTER: Primary | ICD-10-CM

## 2024-01-30 PROCEDURE — 99213 OFFICE O/P EST LOW 20 MIN: CPT | Mod: TH,S$GLB,, | Performed by: OBSTETRICS & GYNECOLOGY

## 2024-01-30 RX ORDER — FERROUS SULFATE TAB 325 MG (65 MG ELEMENTAL FE) 325 (65 FE) MG
TAB ORAL
COMMUNITY
Start: 2023-12-20

## 2024-01-30 NOTE — PROGRESS NOTES
Subjective:       Patient ID: Cassie Moore is a 23 y.o.  at 36w5d     Chief Complaint:  Routine Prenatal Visit      History of Present Illness  No complaints. Reports normal sx. Labs and history reviewed with pt.         Review of Systems  Denies n/v, f/c, dysuria, contractions,   VD, VB, round ligament pain, headaches, preE ROS       Objective:     Vitals:    24 1438   BP: 101/67   Pulse: 102     Wt Readings from Last 3 Encounters:   24 76.2 kg (168 lb)   24 75.5 kg (166 lb 6 oz)   24 76 kg (167 lb 8 oz)       nad  NCAT  pupils normal size  Skin nml no rashes or lesions  No resp distress, resp even and unlabored  Gravid nt, no rebound no guarding  No cyanosis or clubbing, edema appropriate for pregn    FHT: 150's  CVX: 1 50 h    Assessment:        1. Encounter for supervision of normal first pregnancy in third trimester                Plan:        Encouraged PNV  Pain, fever, bleeding precautions   RTC 1 weeks

## 2024-02-06 ENCOUNTER — TELEPHONE (OUTPATIENT)
Dept: OBSTETRICS AND GYNECOLOGY | Facility: CLINIC | Age: 24
End: 2024-02-06
Payer: MEDICAID

## 2024-02-06 LAB
APPEARANCE, UA: CLEAR
BACTERIA SPEC CULT: ABNORMAL /HPF
BILIRUB UR QL STRIP: NEGATIVE MG/DL
COLOR UR: ABNORMAL
GLUCOSE (UA): 250 MG/DL
HGB UR QL STRIP: 10 /UL
KETONES UR QL STRIP: ABNORMAL MG/DL
LEUKOCYTE ESTERASE UR QL STRIP: NEGATIVE /UL
NITRITE UR QL STRIP: NEGATIVE
PH UR STRIP: 6 PH (ref 5–9)
PROT UR QL STRIP: ABNORMAL MG/DL
RBC #/AREA URNS HPF: ABNORMAL /HPF (ref 0–2)
SERVICE COMMENT 03: ABNORMAL
SP GR UR STRIP: 1.02 (ref 1–1.03)
SPECIMEN COLLECTION METHOD, URINE: ABNORMAL
SQUAMOUS EPITHELIAL, UA: ABNORMAL /LPF
UROBILINOGEN UR STRIP-ACNC: NORMAL MG/DL
WBC #/AREA URNS HPF: ABNORMAL /HPF (ref 0–5)

## 2024-02-06 NOTE — TELEPHONE ENCOUNTER
--  Pt was informed to go to Labor and Delivery for evaluation. Pt is aware and voices understanding. Melinda        --- Message from Pam Bennett sent at 2/6/2024 12:26 PM CST -----  Contact: Cassie Harris is calling in regards to her having contraction and would like to be seen on 02/06.please call back at .[phone]           Thanks  FRANSISCA

## 2024-02-07 ENCOUNTER — ROUTINE PRENATAL (OUTPATIENT)
Dept: OBSTETRICS AND GYNECOLOGY | Facility: CLINIC | Age: 24
End: 2024-02-07
Payer: MEDICAID

## 2024-02-07 VITALS
WEIGHT: 173 LBS | SYSTOLIC BLOOD PRESSURE: 118 MMHG | DIASTOLIC BLOOD PRESSURE: 74 MMHG | BODY MASS INDEX: 28.79 KG/M2 | HEART RATE: 94 BPM

## 2024-02-07 DIAGNOSIS — Z34.83 ENCOUNTER FOR SUPERVISION OF NORMAL PREGNANCY IN MULTIGRAVIDA IN THIRD TRIMESTER: Primary | ICD-10-CM

## 2024-02-07 PROCEDURE — 99213 OFFICE O/P EST LOW 20 MIN: CPT | Mod: TH,S$GLB,, | Performed by: NURSE PRACTITIONER

## 2024-02-07 NOTE — PROGRESS NOTES
CC: Follow-Up OB    HPI:   23 y.o.  at 37w6d with EDC of 2024, by Ultrasound, here for her follow up OB visit.  Denies any complaints.    Pregnancy ROS:  Positive fetal movement   Negative leakage of fluid   Negative vaginal bleeding   Negative headache, vision changes, RUQ pain, epigastric pain  Negative contractions/abdominal pain   Negative pelvic pressure     Physical Exam:  Prenatal Vitals  BP: 118/74  Weight: 78.5 kg (173 lb)  Fetal Heart Rate: 140s  Urine Glucose: Negative  Urine Albumin: Negative    Wt Readings from Last 3 Encounters:   24 78.5 kg (173 lb)   24 76.2 kg (168 lb)   24 75.5 kg (166 lb 6 oz)       Body mass index is 28.79 kg/m².    General: NAD, well developed, well nourished  Psych: alert and oriented to person, time and place, normal affect  HEENT: normocephalic, atraumatic  Abd: Gravid, soft, NT, ND  Skin: warm, dry  Neuro: normal gait, gross motor function intact  Cvx ft/posterior/vtx  No cyanosis, clubbing or edema    FHTs: 140s  FH: AGA    Maternal Blood Type: O+/-    GBS negative     ASSESSMENT: 23 y.o.  @ 37w6d with   1. Encounter for supervision of normal pregnancy in multigravida in third trimester         PLAN:  Encounter for supervision of normal pregnancy in multigravida in third trimester       Pain, fever, bleeding precautions  Labor, Fetal movement, and Preeclampsia precautions  Cont PNV  Return to clinic in 1 week w/Dr Delgado

## 2024-02-12 ENCOUNTER — ROUTINE PRENATAL (OUTPATIENT)
Dept: OBSTETRICS AND GYNECOLOGY | Facility: CLINIC | Age: 24
End: 2024-02-12
Payer: MEDICAID

## 2024-02-12 VITALS
SYSTOLIC BLOOD PRESSURE: 109 MMHG | HEART RATE: 103 BPM | BODY MASS INDEX: 28.46 KG/M2 | DIASTOLIC BLOOD PRESSURE: 71 MMHG | WEIGHT: 171 LBS

## 2024-02-12 DIAGNOSIS — Z34.03 ENCOUNTER FOR SUPERVISION OF NORMAL FIRST PREGNANCY IN THIRD TRIMESTER: Primary | ICD-10-CM

## 2024-02-12 PROCEDURE — 99213 OFFICE O/P EST LOW 20 MIN: CPT | Mod: TH,S$GLB,, | Performed by: OBSTETRICS & GYNECOLOGY

## 2024-02-12 NOTE — PROGRESS NOTES
Subjective:       Patient ID: Cassie Moore is a 23 y.o.  at 38w4d     Chief Complaint:  Routine Prenatal Visit      History of Present Illness  No complaints. Reports normal sx. Labs and history reviewed with pt.         Review of Systems  Denies n/v, f/c, dysuria, contractions,   VD, VB, round ligament pain, headaches, preE ROS       Objective:   There were no vitals filed for this visit.  Wt Readings from Last 3 Encounters:   24 78.5 kg (173 lb)   24 76.2 kg (168 lb)   24 75.5 kg (166 lb 6 oz)       nad  NCAT  pupils normal size  Skin nml no rashes or lesions  No resp distress, resp even and unlabored  Gravid nt, no rebound no guarding  No cyanosis or clubbing, edema appropriate for pregn    FHT: 150's  CVX: 1 th h    Assessment:        1. Encounter for supervision of normal first pregnancy in third trimester                Plan:      gbs  Encouraged PNV  Pain, fever, bleeding precautions   IOL thu

## 2024-02-15 ENCOUNTER — OUTSIDE PLACE OF SERVICE (OUTPATIENT)
Dept: OBSTETRICS AND GYNECOLOGY | Facility: CLINIC | Age: 24
End: 2024-02-15
Payer: MEDICAID

## 2024-02-15 PROCEDURE — 59409 OBSTETRICAL CARE: CPT | Mod: GB,,, | Performed by: OBSTETRICS & GYNECOLOGY

## 2024-02-16 PROCEDURE — 99238 HOSP IP/OBS DSCHRG MGMT 30/<: CPT | Mod: ,,, | Performed by: OBSTETRICS & GYNECOLOGY

## 2024-04-01 ENCOUNTER — POSTPARTUM VISIT (OUTPATIENT)
Dept: OBSTETRICS AND GYNECOLOGY | Facility: CLINIC | Age: 24
End: 2024-04-01
Payer: MEDICAID

## 2024-04-01 VITALS
HEART RATE: 74 BPM | SYSTOLIC BLOOD PRESSURE: 112 MMHG | DIASTOLIC BLOOD PRESSURE: 70 MMHG | WEIGHT: 150 LBS | BODY MASS INDEX: 24.96 KG/M2

## 2024-04-01 RX ORDER — NORGESTIMATE AND ETHINYL ESTRADIOL 0.25-0.035
1 KIT ORAL DAILY
Qty: 90 TABLET | Refills: 3 | Status: SHIPPED | OUTPATIENT
Start: 2024-04-01 | End: 2025-04-01

## 2024-04-01 NOTE — PROGRESS NOTES
Subjective:       Patient ID: Cassie Moore is a 23 y.o. female.    Chief Complaint:  Postpartum Care      History of Present Illness  Here for 6 wk pp exam sp   Complaints none    Review of Systems  Review of Systems   Constitutional:  Negative for chills and fever.   Respiratory:  Negative for shortness of breath.    Cardiovascular:  Negative for chest pain.   Gastrointestinal:  Negative for abdominal pain, blood in stool, constipation, diarrhea, nausea, vomiting and reflux.   Genitourinary:  Negative for dysmenorrhea, dyspareunia, dysuria, hematuria, hot flashes, menorrhagia, menstrual problem, pelvic pain, vaginal bleeding, vaginal discharge, postcoital bleeding and vaginal dryness.   Musculoskeletal:  Negative for arthralgias and joint swelling.   Integumentary:  Negative for rash, hair changes, breast mass, nipple discharge and breast skin changes.   Psychiatric/Behavioral:  Negative for depression. The patient is not nervous/anxious.    Breast: Negative for asymmetry, lump, mass, nipple discharge and skin changes          Objective:    Physical Exam:   Constitutional: She appears well-developed and well-nourished. No distress.    HENT:   Head: Normocephalic.    Eyes: Conjunctivae and EOM are normal.    Neck: No tracheal deviation present. No thyromegaly present.    Cardiovascular:       Exam reveals no clubbing, no cyanosis and no edema.        Pulmonary/Chest: Effort normal. No respiratory distress.                  Musculoskeletal: Normal range of motion and moves all extremeties.        Skin: No rash noted. She is not diaphoretic. No cyanosis. Nails show no clubbing.    Psychiatric: She has a normal mood and affect. Her behavior is normal. Judgment and thought content normal.          Assessment:     Postpartum  Depression screen nml    Plan:   rtc for annual or prn  Contraception ocps  Preventative screening utdsx

## 2024-10-01 ENCOUNTER — OFFICE VISIT (OUTPATIENT)
Dept: OBSTETRICS AND GYNECOLOGY | Facility: CLINIC | Age: 24
End: 2024-10-01
Payer: MEDICAID

## 2024-10-01 VITALS
HEART RATE: 71 BPM | WEIGHT: 157 LBS | BODY MASS INDEX: 26.13 KG/M2 | SYSTOLIC BLOOD PRESSURE: 127 MMHG | DIASTOLIC BLOOD PRESSURE: 82 MMHG

## 2024-10-01 DIAGNOSIS — Z01.419 ENCOUNTER FOR GYNECOLOGICAL EXAMINATION WITHOUT ABNORMAL FINDING: Primary | ICD-10-CM

## 2024-10-01 PROCEDURE — 3074F SYST BP LT 130 MM HG: CPT | Mod: CPTII,,, | Performed by: OBSTETRICS & GYNECOLOGY

## 2024-10-01 PROCEDURE — 1159F MED LIST DOCD IN RCRD: CPT | Mod: CPTII,,, | Performed by: OBSTETRICS & GYNECOLOGY

## 2024-10-01 PROCEDURE — 99395 PREV VISIT EST AGE 18-39: CPT | Mod: S$PBB,,, | Performed by: OBSTETRICS & GYNECOLOGY

## 2024-10-01 PROCEDURE — 3079F DIAST BP 80-89 MM HG: CPT | Mod: CPTII,,, | Performed by: OBSTETRICS & GYNECOLOGY

## 2024-10-01 PROCEDURE — 3008F BODY MASS INDEX DOCD: CPT | Mod: CPTII,,, | Performed by: OBSTETRICS & GYNECOLOGY

## 2024-10-01 RX ORDER — NORGESTIMATE AND ETHINYL ESTRADIOL 0.25-0.035
1 KIT ORAL DAILY
Qty: 90 TABLET | Refills: 3 | Status: SHIPPED | OUTPATIENT
Start: 2024-10-01 | End: 2025-10-01

## 2024-10-01 NOTE — PROGRESS NOTES
Subjective:       Patient ID: Cassie Moore is a 24 y.o. female.    Chief Complaint:  Well Woman      History of Present Illness  Pt here for gyn annual.  History and past labs reviewed with patient.    Complaints none      Review of Systems  Review of Systems   Constitutional:  Negative for chills and fever.   Respiratory:  Negative for shortness of breath.    Cardiovascular:  Negative for chest pain.   Gastrointestinal:  Negative for abdominal pain, blood in stool, constipation, diarrhea, nausea, vomiting and reflux.   Genitourinary:  Negative for dysmenorrhea, dyspareunia, dysuria, hematuria, hot flashes, menorrhagia, menstrual problem, pelvic pain, vaginal bleeding, vaginal discharge, postcoital bleeding and vaginal dryness.   Musculoskeletal:  Negative for arthralgias and joint swelling.   Integumentary:  Negative for rash, hair changes, breast mass, nipple discharge and breast skin changes.   Psychiatric/Behavioral:  Negative for depression. The patient is not nervous/anxious.    Breast: Negative for asymmetry, lump, mass, nipple discharge and skin changes          Objective:     Vitals:    10/01/24 0737   BP: 127/82   Pulse: 71   Weight: 71.2 kg (157 lb)      Female chaperone present   Physical Exam:   Constitutional: She appears well-developed and well-nourished. No distress.    HENT:   Head: Normocephalic and atraumatic.    Eyes: Conjunctivae and EOM are normal.    Neck: No tracheal deviation present. No thyromegaly present.    Cardiovascular:       Exam reveals no clubbing, no cyanosis and no edema.        Pulmonary/Chest: Effort normal. No respiratory distress.        Abdominal: Soft. She exhibits no distension and no mass. There is no abdominal tenderness. There is no rebound and no guarding. No hernia.     Genitourinary:    Vagina, uterus and rectum normal.      Pelvic exam was performed with patient supine.   There is no rash, tenderness, lesion or injury on the right labia. There is no rash,  tenderness, lesion or injury on the left labia. Cervix is normal. Right adnexum displays no mass, no tenderness and no fullness. Left adnexum displays no mass, no tenderness and no fullness.                Skin: She is not diaphoretic. No cyanosis. Nails show no clubbing.            Assessment:        1. Encounter for gynecological examination without abnormal finding               Plan:      Pap utd  Gc cz  Mmg NA

## 2024-10-22 ENCOUNTER — TELEPHONE (OUTPATIENT)
Dept: FAMILY MEDICINE | Facility: CLINIC | Age: 24
End: 2024-10-22
Payer: MEDICAID

## 2024-10-22 NOTE — TELEPHONE ENCOUNTER
LVM informing pt [facility] is not accepting pt insurance at this time            ----- Message from Mary sent at 10/22/2024 12:40 PM CDT -----  Contact: Cassie  Patient called in regards to she says that she will like to schedule appointment with  for Check up and Est Care. Call Back is [phone]

## 2025-07-16 ENCOUNTER — TELEPHONE (OUTPATIENT)
Dept: OBSTETRICS AND GYNECOLOGY | Facility: CLINIC | Age: 25
End: 2025-07-16
Payer: MEDICAID

## 2025-07-23 ENCOUNTER — OFFICE VISIT (OUTPATIENT)
Dept: OBSTETRICS AND GYNECOLOGY | Facility: CLINIC | Age: 25
End: 2025-07-23
Payer: MEDICAID

## 2025-07-23 VITALS
HEART RATE: 76 BPM | SYSTOLIC BLOOD PRESSURE: 117 MMHG | BODY MASS INDEX: 23.93 KG/M2 | WEIGHT: 143.81 LBS | DIASTOLIC BLOOD PRESSURE: 79 MMHG

## 2025-07-23 DIAGNOSIS — Z30.09 FAMILY PLANNING: Primary | ICD-10-CM

## 2025-07-23 PROCEDURE — 3078F DIAST BP <80 MM HG: CPT | Mod: CPTII,,, | Performed by: NURSE PRACTITIONER

## 2025-07-23 PROCEDURE — 1159F MED LIST DOCD IN RCRD: CPT | Mod: CPTII,,, | Performed by: NURSE PRACTITIONER

## 2025-07-23 PROCEDURE — 99213 OFFICE O/P EST LOW 20 MIN: CPT | Mod: S$PBB,,, | Performed by: NURSE PRACTITIONER

## 2025-07-23 PROCEDURE — 3008F BODY MASS INDEX DOCD: CPT | Mod: CPTII,,, | Performed by: NURSE PRACTITIONER

## 2025-07-23 PROCEDURE — 3074F SYST BP LT 130 MM HG: CPT | Mod: CPTII,,, | Performed by: NURSE PRACTITIONER

## 2025-07-23 RX ORDER — NORETHINDRONE ACETATE AND ETHINYL ESTRADIOL, ETHINYL ESTRADIOL AND FERROUS FUMARATE 1MG-10(24)
1 KIT ORAL DAILY
Qty: 28 TABLET | Refills: 11 | Status: SHIPPED | OUTPATIENT
Start: 2025-07-23 | End: 2026-07-23

## 2025-07-23 NOTE — PROGRESS NOTES
Subjective:       Patient ID: Cassie Moore is a 24 y.o. female.    Chief Complaint:  Contraception      History of Present Illness   Presents for concerns of increased risk of blood clots. Seen at ER for left sided chest pain. Negative w/u for PE . History and past labs reviewed with patient.    Denies any personal hx of blood clots. Does report an increase in anxiety/stress lately.  Patient's last menstrual period was 2025 (exact date).      OB History          1    Para   1    Term   1       0    AB   0    Living   1         SAB   0    IAB   0    Ectopic   0    Multiple   0    Live Births   1                  Review of Systems  Review of Systems   Constitutional:  Negative for appetite change and fatigue.   HENT:  Negative for tinnitus.    Cardiovascular:  Negative for chest pain and palpitations.   Gastrointestinal:  Negative for abdominal pain, bloating, constipation, vomiting and reflux.   Endocrine: Negative for diabetes, hyperthyroidism and hypothyroidism.   Genitourinary:  Negative for flank pain, menstrual problem, pelvic pain and postcoital bleeding.   Musculoskeletal:  Negative for back pain and myalgias.   Integumentary:  Negative for rash, mole/lesion, breast mass and breast tenderness.   Neurological:  Negative for vertigo, syncope, headaches and memory loss.   Psychiatric/Behavioral:  Negative for depression and sleep disturbance. The patient is not nervous/anxious.    Breast: Negative for lump, mass, mastodynia and tenderness          Objective:     Vitals:    25 0831   BP: 117/79   Pulse: 76   Weight: 65.2 kg (143 lb 12.8 oz)        Physical Exam:   Constitutional: She is oriented to person, place, and time. She appears well-developed and well-nourished.    HENT:   Head: Normocephalic and atraumatic.     Neck: No thyromegaly present.    Cardiovascular:  Normal rate.             Pulmonary/Chest: Effort normal. No respiratory distress.                  Musculoskeletal:  Moves all extremeties.       Neurological: She is alert and oriented to person, place, and time.    Skin: Skin is warm and dry.    Psychiatric: She has a normal mood and affect. Her behavior is normal. Judgment and thought content normal.      CT-  Negative for PE     Assessment:     1. Family planning              Plan:       Family planning  -     norethindrone-e.estradioL-iron (LO LOESTRIN FE) 1 mg-10 mcg (24)/10 mcg (2) Tab; Take 1 tablet by mouth once daily.  Dispense: 28 tablet; Refill: 11       All methods of contraception discussed   Risks, benefits, and side effects of each discussed   Offered OCPs, mini pill, Patch, nuvaring, annovera, nexplanon, depo provera, IUD,  vaginal gel   Pt opted for  lo Loestrin     Follow up for Keep appointment with Dr Martin .